# Patient Record
(demographics unavailable — no encounter records)

---

## 2024-12-05 NOTE — HISTORY OF PRESENT ILLNESS
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   Feels well at this time.  No SOB, chest pain, THEODORE, orthopnea, PND, palpitations. Has been trying to lose weight with both diet and mounjaro. Reports she is currently not that physically active due to time constraints with work and her children.   CARDIOMETABOLIC RISK FACTORS: Family History: Father - HLD, DM2, MI in his 50s, CABG Mother - healthy Mat GF - CAD in his 80s Brothers - healthy   Lifestyle History:   -Mediterranean Diet Score (9 question survey) was .       (8-9: optimal, 6-7: near-optimal, 4-5: suboptimal, 0-3: markedly suboptimal)   -Self-described diet:   -Exercise: YES / NO, mild level for 60 minutes per week.   -Smoking: former (in college, stopped for many years)   -EtOH: Y <1 drink/week   -Stress: positive    -Sleep apnea: negative   Do you have polycystic ovarian syndrome? N Have you ever been pregnant? Y If so, how many times? 2 Did you have any complications during pregnancy? N Did you have any postpartum complications? Y - peripartum cardiomyopathy Have you had any miscarriages? N If so, how many? Have you gone through menopause? experiencing menopausal symptoms now If yes, at what age? Did you receive hormone replacement? on the pill  ========================== Labs  Oct 2024 lipids  HDL 72  TG 61, a1c 5.7%, apoB 119, Lp(a) 292.3 nmol/L  Oct 2023 lipids  HDL 58  TG 54, a1c 5.5%

## 2024-12-05 NOTE — DISCUSSION/SUMMARY
[FreeTextEntry1] : #Dyspnea on exertion - given her risk factors, will order CCTA  #HLD #Elevated Lp(a) #ASCVD Risk reduction - start rosuvastatin 20mg daily - encouraged patient to continue healthy exercise and eating habits, focusing on a mostly plant based diet, Mediterranean style of eating and aiming for the recommended 150 minutes per week of moderate physical activity   #Left axis deviation #Abnormal EKG #History of peripartum cardiomyopathy - TTE ordered  #prediabetes #obesity  - refer to Yajaira Deleon for nutrition    RTC 6 months for in person follow up with Dr. Lambert.  [EKG obtained to assist in diagnosis and management of assessed problem(s)] : EKG obtained to assist in diagnosis and management of assessed problem(s)

## 2024-12-05 NOTE — END OF VISIT
[FreeTextEntry3] : Patient seen and examined. Case discussed with preventive cardiology fellow. Agree with plan as detailed above.  [Time Spent: ___ minutes] : I have spent [unfilled] minutes of time on the encounter which excludes teaching and separately reported services.

## 2024-12-05 NOTE — PHYSICAL EXAM
[No Edema] : no edema [Moves all extremities] : moves all extremities [Normal] : alert and oriented, normal memory

## 2024-12-05 NOTE — REASON FOR VISIT
[CV Risk Factors and Non-Cardiac Disease] : CV risk factors and non-cardiac disease [FreeTextEntry3] : Dr. Ryan

## 2025-03-03 NOTE — END OF VISIT
[Time Spent: ___ minutes] : I have spent [unfilled] minutes of time on the encounter which excludes teaching and separately reported services. [] : Fellow [FreeTextEntry3] : Patient seen and examined. Case discussed with preventive cardiology fellow. Agree with plan as detailed above.

## 2025-03-03 NOTE — HISTORY OF PRESENT ILLNESS
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  Feels well at this time.  No SOB, chest pain, THEODORE, orthopnea, PND, palpitations. Has been trying to lose weight with both diet and mounjaro. Reports she has been going to the gym 2-3 times a week.  =================================  CARDIOMETABOLIC RISK FACTORS: Family History: Father - HLD, DM2, MI in his 50s, CABG Mother - healthy Mat GF - CAD in his 80s Brothers - healthy   Lifestyle History:   -Mediterranean Diet Score (9 question survey) was 5.       (8-9: optimal, 6-7: near-optimal, 4-5: suboptimal, 0-3: markedly suboptimal)   -Self-described diet: good   -Exercise: YES mild level for 60 minutes per week.   -Smoking: former (in college, stopped for many years)   -EtOH: Y <1 drink/week   -Stress: positive    -Sleep apnea: negative   Do you have polycystic ovarian syndrome? N Have you ever been pregnant? Y If so, how many times? 2 Did you have any complications during pregnancy? N Did you have any postpartum complications? Y - peripartum cardiomyopathy Have you had any miscarriages? N If so, how many? Have you gone through menopause? experiencing menopausal symptoms now If yes, at what age? Did you receive hormone replacement? on the pill  ========================== Labs Oct 2024 lipids  HDL 72  TG 61, a1c 5.7%, apoB 119, Lp(a) 292.3 nmol/L  Oct 2023 lipids  HDL 58  TG 54, a1c 5.5%  Studies Jan 2025 CCTA - The calcium score is 0 Agatston units. Distal LAD is a small caliber vessel, which is suboptimally visualized, but probably normal. Remaining segments appear angiographically normal. Dec 2024 TTE - nl LV & RV size & fx, no significant valvular disease, no pericardial effusion

## 2025-03-03 NOTE — DISCUSSION/SUMMARY
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  #HLD #Elevated Lp(a) #ASCVD Risk reduction LDL goal at least <100mg/dL. Lp(a) 292.3 nmol/L  - c/w rosuvastatin 20mg daily - lipids today - encouraged patient to continue healthy exercise and eating habits, focusing on a mostly plant based diet, Mediterranean style of eating and aiming for the recommended 150 minutes per week of moderate physical activity   #Left axis deviation #Abnormal EKG #History of peripartum cardiomyopathy TTE within normal range.  #prediabetes #obesity  - refer to Yajaira Deleon for nutrition    RTC 6 months for in person follow up with Dr. Lambert.  [EKG obtained to assist in diagnosis and management of assessed problem(s)] : EKG obtained to assist in diagnosis and management of assessed problem(s)